# Patient Record
Sex: FEMALE | Race: WHITE | Employment: PART TIME | URBAN - METROPOLITAN AREA
[De-identification: names, ages, dates, MRNs, and addresses within clinical notes are randomized per-mention and may not be internally consistent; named-entity substitution may affect disease eponyms.]

---

## 2023-11-22 ENCOUNTER — OFFICE VISIT (OUTPATIENT)
Dept: PRIMARY CARE | Facility: CLINIC | Age: 68
End: 2023-11-22
Payer: MEDICARE

## 2023-11-22 VITALS
BODY MASS INDEX: 23.07 KG/M2 | WEIGHT: 122.2 LBS | HEIGHT: 61 IN | HEART RATE: 68 BPM | OXYGEN SATURATION: 98 % | SYSTOLIC BLOOD PRESSURE: 124 MMHG | DIASTOLIC BLOOD PRESSURE: 64 MMHG

## 2023-11-22 DIAGNOSIS — M25.541 PAIN IN THUMB JOINT WITH MOVEMENT, RIGHT: Primary | ICD-10-CM

## 2023-11-22 DIAGNOSIS — M79.644 THUMB PAIN, RIGHT: ICD-10-CM

## 2023-11-22 PROCEDURE — 4004F PT TOBACCO SCREEN RCVD TLK: CPT | Performed by: PHYSICIAN ASSISTANT

## 2023-11-22 PROCEDURE — 99214 OFFICE O/P EST MOD 30 MIN: CPT | Performed by: PHYSICIAN ASSISTANT

## 2023-11-22 PROCEDURE — 1159F MED LIST DOCD IN RCRD: CPT | Performed by: PHYSICIAN ASSISTANT

## 2023-11-22 PROCEDURE — 1160F RVW MEDS BY RX/DR IN RCRD: CPT | Performed by: PHYSICIAN ASSISTANT

## 2023-11-22 ASSESSMENT — ENCOUNTER SYMPTOMS
OCCASIONAL FEELINGS OF UNSTEADINESS: 0
LOSS OF SENSATION IN FEET: 0

## 2023-11-22 NOTE — PROGRESS NOTES
"Subjective   Patient ID: Kathryn Luna is a 68 y.o. female who presents for thumb pain.    HPI  PT presents for evaluation of R thumb pain. PT denies specific known injury but states approximately 3 weeks ago she was digging flower bulbs out of her garden and had some discomfort in her thumb after doing it. As the days progressed her pain became much more severe and she also noted her pain increased with motion. She states it is very painful to move it and then it will snap back into a straight position. Pt as applied ice to it with minimal improvement. PT does note some swelling - denies redness.     Review of Systems   Constitutional: Negative.    Respiratory: Negative.  Negative for cough, chest tightness and wheezing.    Cardiovascular: Negative.  Negative for chest pain.   Endocrine: Negative.    Musculoskeletal:  Positive for joint swelling.        R thumb pain - DIP joint   Skin: Negative.    Neurological: Negative.    Psychiatric/Behavioral: Negative.         Objective   /64 (BP Location: Right arm, Patient Position: Sitting, BP Cuff Size: Adult)   Pulse 68   Ht 1.549 m (5' 1\")   Wt 55.4 kg (122 lb 3.2 oz)   SpO2 98%   BMI 23.09 kg/m²     Physical Exam  Vitals reviewed.   Constitutional:       Appearance: Normal appearance.   HENT:      Head: Normocephalic and atraumatic.      Mouth/Throat:      Mouth: Mucous membranes are moist.      Pharynx: Oropharynx is clear.   Eyes:      Conjunctiva/sclera: Conjunctivae normal.   Cardiovascular:      Rate and Rhythm: Normal rate and regular rhythm.      Heart sounds: Normal heart sounds.   Pulmonary:      Effort: Pulmonary effort is normal.      Breath sounds: Normal breath sounds. No wheezing.   Musculoskeletal:         General: Swelling, tenderness and signs of injury present.      Comments: R thumb DIP joint with mild swelling - moderate TTP.  PT had significant pain with motion of DIP joint - and ROM was minimal - joint snapped back to position with " extension of DIP/distal phalanx. Pt was noted as tearing/crying with ROM.    Skin:     General: Skin is warm and dry.   Neurological:      Mental Status: She is alert.         Assessment/Plan  PT presents with complaint of R thumb DIP pain, diminished ROM and mild swelling. She states when her thumb is immobile she does not have pain - only with motion does her pain increase. Of concern is trigger thumb vs possible tendon tear. Will obtain and xray and refer PT to orthopedics for consultation. PT was placed in a thumb splint - encouraged to use OTC NSAID's or tylenol for pain. Pt was advised if she has any complications to return otherwise to follow with ortho as planned.  PT was encouraged to schedule a well exam in the near future.     Diagnoses and all orders for this visit:  Pain in thumb joint with movement, right  -     XR thumb right MIN 2 views; Future  -     Referral to Orthopaedic Surgery; Future  Thumb pain, right  -     XR thumb right MIN 2 views; Future  -     Referral to Orthopaedic Surgery; Future

## 2023-11-24 ASSESSMENT — ENCOUNTER SYMPTOMS
CHEST TIGHTNESS: 0
WHEEZING: 0
NEUROLOGICAL NEGATIVE: 1
PSYCHIATRIC NEGATIVE: 1
CONSTITUTIONAL NEGATIVE: 1
ENDOCRINE NEGATIVE: 1
RESPIRATORY NEGATIVE: 1
JOINT SWELLING: 1
CARDIOVASCULAR NEGATIVE: 1
COUGH: 0